# Patient Record
(demographics unavailable — no encounter records)

---

## 2025-03-31 NOTE — HISTORY OF PRESENT ILLNESS
[Entering in September] : entering in September [Public] : Public [Gen Ed: _____] : General Education class [unfilled] [IEP] : Individualized Education Program [Other: ____] : [unfilled] [OHI] : Other Health Impairment [OT: ____] : Occupational Therapy [unfilled] [Aide: _____] : Aide or Paraprofessional [unfilled] [Counseling: _____] : Counseling [unfilled] [TA: Time: _____] : Extra time for tests [unfilled] [BIP] : Behavior Intervention Plan [TA: Other] : Other testing accommodations [12 mos.] : 12 - Month Special Service and/or Program: No [Spec. Transportation] : Special Transportation: No [FreeTextEntry4] : attends [employer].   [FreeTextEntry3] : dated3/19/2024 (see scanned docs). Annual review scheduled for 3/5/2025. [FreeTextEntry2] : At time of IEP review (3rd gr), [employer] is functioning on grade level for both Reading & Math [FreeTextEntry1] : 2024-25 School Year-- [employer] plans to attend a full five day in-person learning schedule     [TWNoteComboBox1] : 4th Grade

## 2025-03-31 NOTE — PLAN
[FreeTextEntry1] :   ADHD-- increase the Cotempla XR-ODT to 25.9mg with breakfast and the Methylphenidate HCL to 20mg BID PRN (hopefully 20mg = 2.5-3hrs).  Anxiety---continue with 1:1 counseling as per. DIRK's IEP. Stable therefore in-person private counseling at the [facility] then eventually Family Therapy on hold.   ASD/ODD-- Trial of Guanfacine (1mg) 1/2-tablet at HS x 7 days then on day #8, change administration time to after school. Medication educational handout given for reference. Mom to call with any concerns. Home [facility] therapy on hold as parenting modifications is effective.   Topics discussed with parent, refer to counseling section of note.  .Parent is aware to call the office as needed should any concerns or questions arise otherwise return in 4-7 months.  [Findings (To Date)] : Findings from evaluation (to date) [Clinical Basis] : Clinical basis for current diagnosis and clinical impressions [Co-Morbidities] : Clinical disorders and problem commonly associated with this child's condition (now or in the future) [Prognosis] : Prognosis [Goals / Benefits] : Goals & potential benefits of treatment with medication, as well as the limitations of pharmacotherapy [Stimulants] : Potential benefits and limitations of treatment with stimulant medication.  Potential adverse events were also reviewed, including insomnia, reduced appetite, change in blood pressure or heart rate, headache, stomachache, slowing of growth, moodiness, and onset of tics [Alpha-2s] : Potential benefits and limitations of treatment with alpha-2 agonists. Potential adverse events were also reviewed, including dry mouth, constipation, sedation, and change in blood pressure with potential for light-headedness when standing.  [Compliance] : Importance of medication compliance [AE Strategies] : Strategies to reduce side effects from current or proposed medication regimen [Counseling] : Benefits and limits of counseling or therapy [Behavior Modification] : Behavior modification strategies [Resources] : Other available resources [Family Questions] : Family's questions were addressed [Diet] : Evidence-based clinical information about diet [Sleep] : The importance of sleep and strategies to ensure adequate sleep [Media / Screen Time] : Importance of limiting electronics, media, and screen time [Exercise] : Regular exercise [Reading] : Importance of daily reading [Injury Prevention] : injury prevention [Rationale for Medication Discussed] : The rationale for treating inattention, distractibility, hyperactivity, or impulsivity with medication was discussed. The desired effects, possible side effects, and need for monitoring response were reviewed. Information about various medication options was provided.  The option of not treating with medication was also discussed. [Cardiac risk factors for treatment] : Cardiac risk factors for treatment of stimulant medications were reviewed, including history of prior seizure, unexplained loss of consciousness, congenital heart disease, arrhythmias, or family history of sudden unexplained cardiac death in family members below the age of 40.

## 2025-03-31 NOTE — REASON FOR VISIT
[Follow-Up Visit] : a follow-up visit for [ADHD] : ADHD [Autism Spectrum Disorder] : autism spectrum disorder [Problems with Social Interaction] : problems with social interaction [Response to Medication] : response to medication [Progress with Services] : progress with services [Patient] : patient [Mother] : mother [FreeTextEntry4] : Cotempla XR-ODT 17.3mg Aug 2021--Methylphenidate HCL 10mg BID PRN Adderall XR 10mg during school days (AE = irritable)--D/c'ed in Aug 2021 Adderall 5mg BID PRN--D/c'ed in Aug 2021  [FreeTextEntry3] : March 12, 2024